# Patient Record
Sex: MALE | Race: WHITE
[De-identification: names, ages, dates, MRNs, and addresses within clinical notes are randomized per-mention and may not be internally consistent; named-entity substitution may affect disease eponyms.]

---

## 2018-09-10 ENCOUNTER — HOSPITAL ENCOUNTER (EMERGENCY)
Dept: HOSPITAL 61 - ER | Age: 53
Discharge: HOME | End: 2018-09-10
Payer: SELF-PAY

## 2018-09-10 VITALS — HEIGHT: 68 IN | BODY MASS INDEX: 25.76 KG/M2 | WEIGHT: 170 LBS

## 2018-09-10 VITALS — DIASTOLIC BLOOD PRESSURE: 96 MMHG | SYSTOLIC BLOOD PRESSURE: 162 MMHG

## 2018-09-10 DIAGNOSIS — Z90.49: ICD-10-CM

## 2018-09-10 DIAGNOSIS — K52.9: Primary | ICD-10-CM

## 2018-09-10 DIAGNOSIS — F17.200: ICD-10-CM

## 2018-09-10 LAB
ALBUMIN SERPL-MCNC: 3.4 G/DL (ref 3.4–5)
ALBUMIN/GLOB SERPL: 0.8 {RATIO} (ref 1–1.7)
ALP SERPL-CCNC: 123 U/L (ref 46–116)
ALT SERPL-CCNC: 21 U/L (ref 16–63)
ANION GAP SERPL CALC-SCNC: 6 MMOL/L (ref 6–14)
APTT PPP: YELLOW S
AST SERPL-CCNC: 16 U/L (ref 15–37)
BACTERIA #/AREA URNS HPF: (no result) /HPF
BASOPHILS # BLD AUTO: 0.1 X10^3/UL (ref 0–0.2)
BASOPHILS NFR BLD: 1 % (ref 0–3)
BILIRUB SERPL-MCNC: 0.3 MG/DL (ref 0.2–1)
BILIRUB UR QL STRIP: NEGATIVE
BUN SERPL-MCNC: 5 MG/DL (ref 8–26)
BUN/CREAT SERPL: 6 (ref 6–20)
CALCIUM SERPL-MCNC: 9.2 MG/DL (ref 8.5–10.1)
CHLORIDE SERPL-SCNC: 101 MMOL/L (ref 98–107)
CO2 SERPL-SCNC: 31 MMOL/L (ref 21–32)
CREAT SERPL-MCNC: 0.9 MG/DL (ref 0.7–1.3)
EOSINOPHIL NFR BLD: 0.1 X10^3/UL (ref 0–0.7)
EOSINOPHIL NFR BLD: 1 % (ref 0–3)
ERYTHROCYTE [DISTWIDTH] IN BLOOD BY AUTOMATED COUNT: 14.3 % (ref 11.5–14.5)
FIBRINOGEN PPP-MCNC: CLEAR MG/DL
GFR SERPLBLD BASED ON 1.73 SQ M-ARVRAT: 87.9 ML/MIN
GLOBULIN SER-MCNC: 4.4 G/DL (ref 2.2–3.8)
GLUCOSE SERPL-MCNC: 106 MG/DL (ref 70–99)
HCT VFR BLD CALC: 45.7 % (ref 39–53)
HGB BLD-MCNC: 16.1 G/DL (ref 13–17.5)
LIPASE: 204 U/L (ref 73–393)
LYMPHOCYTES # BLD: 1.9 X10^3/UL (ref 1–4.8)
LYMPHOCYTES NFR BLD AUTO: 13 % (ref 24–48)
MCH RBC QN AUTO: 35 PG (ref 25–35)
MCHC RBC AUTO-ENTMCNC: 35 G/DL (ref 31–37)
MCV RBC AUTO: 100 FL (ref 79–100)
MONO #: 0.9 X10^3/UL (ref 0–1.1)
MONOCYTES NFR BLD: 7 % (ref 0–9)
NEUT #: 11.4 X10^3UL (ref 1.8–7.7)
NEUTROPHILS NFR BLD AUTO: 79 % (ref 31–73)
NITRITE UR QL STRIP: NEGATIVE
PH UR STRIP: 7.5 [PH]
PLATELET # BLD AUTO: 303 X10^3/UL (ref 140–400)
POTASSIUM SERPL-SCNC: 4 MMOL/L (ref 3.5–5.1)
PROT SERPL-MCNC: 7.8 G/DL (ref 6.4–8.2)
PROT UR STRIP-MCNC: NEGATIVE MG/DL
RBC # BLD AUTO: 4.59 X10^6/UL (ref 4.3–5.7)
RBC #/AREA URNS HPF: 0 /HPF (ref 0–2)
SODIUM SERPL-SCNC: 138 MMOL/L (ref 136–145)
SQUAMOUS #/AREA URNS LPF: (no result) /LPF
UROBILINOGEN UR-MCNC: 1 MG/DL
WBC # BLD AUTO: 14.4 X10^3/UL (ref 4–11)
WBC #/AREA URNS HPF: (no result) /HPF (ref 0–4)

## 2018-09-10 PROCEDURE — 81001 URINALYSIS AUTO W/SCOPE: CPT

## 2018-09-10 PROCEDURE — 96375 TX/PRO/DX INJ NEW DRUG ADDON: CPT

## 2018-09-10 PROCEDURE — 93005 ELECTROCARDIOGRAM TRACING: CPT

## 2018-09-10 PROCEDURE — 96374 THER/PROPH/DIAG INJ IV PUSH: CPT

## 2018-09-10 PROCEDURE — 74177 CT ABD & PELVIS W/CONTRAST: CPT

## 2018-09-10 PROCEDURE — 96361 HYDRATE IV INFUSION ADD-ON: CPT

## 2018-09-10 PROCEDURE — 83690 ASSAY OF LIPASE: CPT

## 2018-09-10 PROCEDURE — 36415 COLL VENOUS BLD VENIPUNCTURE: CPT

## 2018-09-10 PROCEDURE — 85025 COMPLETE CBC W/AUTO DIFF WBC: CPT

## 2018-09-10 PROCEDURE — 80053 COMPREHEN METABOLIC PANEL: CPT

## 2018-09-10 PROCEDURE — 99285 EMERGENCY DEPT VISIT HI MDM: CPT

## 2018-09-10 PROCEDURE — 84484 ASSAY OF TROPONIN QUANT: CPT

## 2018-09-10 RX ADMIN — IOHEXOL ONE ML: 300 INJECTION, SOLUTION INTRAVENOUS at 17:50

## 2018-09-10 RX ADMIN — METHYLPREDNISOLONE SODIUM SUCCINATE ONE MG: 125 INJECTION, POWDER, FOR SOLUTION INTRAMUSCULAR; INTRAVENOUS at 18:33

## 2018-09-10 RX ADMIN — Medication ONE ML: at 16:44

## 2018-09-10 RX ADMIN — FENTANYL CITRATE ONE MCG: 50 INJECTION INTRAMUSCULAR; INTRAVENOUS at 16:44

## 2018-09-10 RX ADMIN — BACITRACIN ONE MLS/HR: 5000 INJECTION, POWDER, FOR SOLUTION INTRAMUSCULAR at 16:43

## 2018-09-10 NOTE — RAD
PQRS Compliance statement:

 

One or more of the following individualized dose reduction techniques were

utilized for this examination:

1. Automated exposure control.

2. Adjustment of the mA and/or kV according to patient size.

3. Use of iterative reconstruction technique.

 

Indication:Abdominal pain. Evaluate for small bowel obstruction.

 

TECHNIQUE: CT abdomen and pelvis with IV contrast with multiplanar 

reformats.

 

COMPARISON: None

 

FINDINGS:

Heart is normal in size. No pericardial or pleural effusion. Motion 

artifact is seen in the lung bases limiting optimal evaluation. Motion 

artifact is also seen in the upper abdomen limiting optimal evaluation. 

1.1 cm low attenuating lesion is seen in segment 5 of the liver. 

Otherwise, liver, spleen, pancreas, adrenals and kidneys are grossly 

within normal limits. No enlarged retroperitoneal or pelvic adenopathy. 

Small fat-containing right inguinal hernia. No free pelvic fluid or 

ascites. No bowel obstruction. The prostate and seminal vesicles show no 

large mass. Urinary bladder is within normal limits. No pneumatosis 

intestinalis or pneumoperitoneum. There is mild circumferential wall 

thickening of the distal esophagus. Mild circumferential wall thickening 

of the distal duodenum and jejunum. Incidental note made of retroaortic 

left renal vein. No suspicious bony lesion.

 

IMPRESSION:

Motion artifact is seen in the lung bases and upper abdomen limiting 

optimal evaluation.

1. Mild circumferential wall thickening of the distal duodenum and jejunum

may be secondary to enteritis. No evidence of bowel obstruction.

2. Indeterminate right hepatic lobe lesion. Differential diagnoses 

includes cystic biliary hamartoma or hemangioma in absence of known 

primary malignancy. Either direct comparison with outside/prior imaging or

nonemergent MRI of the abdomen recommended for definite confirmation.

 

Electronically signed by: Rajiv Victor DO (9/10/2018 6:05 PM) [facility]

## 2018-09-10 NOTE — EKG
[facility]

              [address] [place] [address]

Test Date:    2018-09-10               Test Time:    15:19:09

Pat Name:     MARIELA ABRAMS           Department:   

Patient ID:   [id number]           Room:          

Gender:                               Technician:   

:          1965               Requested By: SINAN DASILVA

Order Number: [id number]           Reading MD:   Kyle Collier

                                 Measurements

Intervals                              Axis          

Rate:         67                       P:            1

LA:           136                      QRS:          -30

QRSD:         94                       T:            37

QT:           376                                    

QTc:          400                                    

                           Interpretive Statements

SINUS RHYTHM

ABNORMAL LEFT AXIS DEVIATION

LEFT ANTERIOR FASCICULAR BLOCK

ABNORMAL ECG

RI6.01

No previous ECG available for comparison



Electronically Signed On 2018 16:28:06 CDT by Kyle Collier